# Patient Record
Sex: MALE | Race: OTHER | Employment: STUDENT | URBAN - METROPOLITAN AREA
[De-identification: names, ages, dates, MRNs, and addresses within clinical notes are randomized per-mention and may not be internally consistent; named-entity substitution may affect disease eponyms.]

---
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
## 2021-03-17 ENCOUNTER — OFFICE VISIT (OUTPATIENT)
Dept: FAMILY MEDICINE CLINIC | Facility: CLINIC | Age: 21
End: 2021-03-17
Payer: COMMERCIAL

## 2021-03-17 VITALS
HEART RATE: 57 BPM | HEIGHT: 72 IN | WEIGHT: 143.81 LBS | DIASTOLIC BLOOD PRESSURE: 73 MMHG | BODY MASS INDEX: 19.48 KG/M2 | SYSTOLIC BLOOD PRESSURE: 122 MMHG

## 2021-03-17 DIAGNOSIS — M54.2 NECK PAIN: Primary | ICD-10-CM

## 2021-03-17 PROCEDURE — 3074F SYST BP LT 130 MM HG: CPT | Performed by: FAMILY MEDICINE

## 2021-03-17 PROCEDURE — 3008F BODY MASS INDEX DOCD: CPT | Performed by: FAMILY MEDICINE

## 2021-03-17 PROCEDURE — 3078F DIAST BP <80 MM HG: CPT | Performed by: FAMILY MEDICINE

## 2021-03-17 PROCEDURE — 99213 OFFICE O/P EST LOW 20 MIN: CPT | Performed by: FAMILY MEDICINE

## 2021-03-17 NOTE — PROGRESS NOTES
3/17/2021  10:39 AM    Jonasomuar Copeland. is a 24year old male.     Chief complaint(s): Patient presents with:  Back Pain  Throat Problem: c/o pain with swallowing for couple months   Nasal Congestion    HPI:     Brie Copeland. primary complaint is regardi wheezing. Cardiovascular: Negative for chest pain and palpitations. Musculoskeletal: Positive for back pain. Skin: Negative for rash. Allergic/Immunologic: Negative for environmental allergies and food allergies.    Neurological: Positive for heada the [facility] if there is a deterioration or worsening of the medical condition. Also, inform the doctor with any new symptoms or medications' side effects. FOLLOW-UP: Schedule a follow-up visit in  1-2 weeks/ prn.            Orders This Visit:

## 2021-12-08 ENCOUNTER — TELEMEDICINE (OUTPATIENT)
Dept: FAMILY MEDICINE CLINIC | Facility: CLINIC | Age: 21
End: 2021-12-08
Payer: COMMERCIAL

## 2021-12-08 DIAGNOSIS — B34.9 ACUTE VIRAL SYNDROME: Primary | ICD-10-CM

## 2021-12-08 PROCEDURE — 99443 PHONE E/M BY PHYS 21-30 MIN: CPT | Performed by: FAMILY MEDICINE

## 2021-12-08 RX ORDER — AZITHROMYCIN 250 MG/1
TABLET, FILM COATED ORAL
Qty: 6 TABLET | Refills: 0 | Status: SHIPPED | OUTPATIENT
Start: 2021-12-08 | End: 2021-12-13

## 2021-12-08 RX ORDER — BENZONATATE 200 MG/1
200 CAPSULE ORAL 3 TIMES DAILY PRN
Qty: 30 CAPSULE | Refills: 0 | Status: SHIPPED | OUTPATIENT
Start: 2021-12-08

## 2021-12-08 NOTE — PROGRESS NOTES
Virtual VideoTelephone Check-In    [facility]. verbally consents to a Virtual/Telephone Check-In visit on 12/08/21. Patient has been referred to the [facility] website at [email or web address] to review the yearly Consent to Treat document.     Patient u office with any new or further questions or concerns that may come up in the near future. Notify Dr Karyn Lemus or the [facility], [facility] if there is a deterioration or worsening of the medical condition.  Also, inform the doctor with any new symptoms or medicati

## 2021-12-21 NOTE — TELEPHONE ENCOUNTER
Pt's mother, Sarah Green is calling stating pt is experiencing vomiting, diarrhea, and fever. She states pt saw Dr. Noah Ramos for this issue last week, however pt symptoms are getting worse.     Transf to Triage Are Labs Available For Review?: No- Not Drawn Yet

## 2023-01-12 ENCOUNTER — OFFICE VISIT (OUTPATIENT)
Dept: FAMILY MEDICINE CLINIC | Facility: CLINIC | Age: 23
End: 2023-01-12

## 2023-01-12 ENCOUNTER — HOSPITAL ENCOUNTER (OUTPATIENT)
Dept: GENERAL RADIOLOGY | Age: 23
Discharge: HOME | End: 2023-01-12
Attending: FAMILY MEDICINE
Payer: COMMERCIAL

## 2023-01-12 VITALS
HEIGHT: 72 IN | SYSTOLIC BLOOD PRESSURE: 132 MMHG | DIASTOLIC BLOOD PRESSURE: 78 MMHG | WEIGHT: 138.81 LBS | BODY MASS INDEX: 18.8 KG/M2 | HEART RATE: 76 BPM

## 2023-01-12 DIAGNOSIS — M77.8 THUMB TENDONITIS: ICD-10-CM

## 2023-01-12 DIAGNOSIS — M79.644 PAIN OF RIGHT THUMB: ICD-10-CM

## 2023-01-12 DIAGNOSIS — M79.644 PAIN OF RIGHT THUMB: Primary | ICD-10-CM

## 2023-01-12 PROCEDURE — 99213 OFFICE O/P EST LOW 20 MIN: CPT | Performed by: FAMILY MEDICINE

## 2023-01-12 PROCEDURE — 3078F DIAST BP <80 MM HG: CPT | Performed by: FAMILY MEDICINE

## 2023-01-12 PROCEDURE — 73130 X-RAY EXAM OF HAND: CPT | Performed by: FAMILY MEDICINE

## 2023-01-12 PROCEDURE — 3008F BODY MASS INDEX DOCD: CPT | Performed by: FAMILY MEDICINE

## 2023-01-12 PROCEDURE — 3075F SYST BP GE 130 - 139MM HG: CPT | Performed by: FAMILY MEDICINE

## 2023-12-27 ENCOUNTER — OFFICE VISIT (OUTPATIENT)
Dept: OTOLARYNGOLOGY | Facility: CLINIC | Age: 23
End: 2023-12-27

## 2023-12-27 DIAGNOSIS — R07.0 ODYNOPHONIA: ICD-10-CM

## 2023-12-27 DIAGNOSIS — R13.10 ODYNOPHAGIA: ICD-10-CM

## 2023-12-27 DIAGNOSIS — R49.0 DYSPHONIA: Primary | ICD-10-CM

## 2023-12-27 PROCEDURE — 31575 DIAGNOSTIC LARYNGOSCOPY: CPT | Performed by: STUDENT IN AN ORGANIZED HEALTH CARE EDUCATION/TRAINING PROGRAM

## 2023-12-27 PROCEDURE — 99203 OFFICE O/P NEW LOW 30 MIN: CPT | Performed by: STUDENT IN AN ORGANIZED HEALTH CARE EDUCATION/TRAINING PROGRAM

## 2023-12-27 NOTE — PROGRESS NOTES
[place]  OTOLARYNGOLOGY - HEAD & NECK SURGERY    12/27/2023     Reason for Consultation:   Voice strain    History of Present Illness:   Patient is a pleasant 26year old male who is being seen for vocal issues over the last few weeks. 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 He does note he had a recent viral upper respiratory infection and was having sharp pain with swallowing. He is concerned about his vocal performance due to his upcoming recording. He denies any history of acid reflux. He has been practicing a lot recently making him a little more prone to vocal trauma. He does also sometimes feel tightness within his throat. He has not had any recent opportunity for voice rest.    Past Medical History  History reviewed. No pertinent past medical history. Past Surgical History  History reviewed. No pertinent surgical history. Family History  Family History   Problem Relation Age of Onset    Colon Cancer Paternal Grandmother        Social History  Pediatric History   Patient Parents    Guadalupe Terrazas (Father)    Srikanth Spencer (Mother)     Other Topics Concern    Not on file   Social History Narrative    Not on file           Current Medications:  Current Outpatient Medications   Medication Sig Dispense Refill    diclofenac 1 % External Gel Apply 4 g topically 4 (four) times daily. Apply to affected area(s). 60 g 2       Allergies  No Known Allergies    Review of Systems:   A comprehensive 10 point review of systems was completed. Pertinent positives and negatives noted in the the HPI. Physical Exam:   There were no vitals taken for this visit.     GENERAL: No acute distress, Comfortable appearing  FACE: HB 1/6, Normal Animation  HEAD: Normocephalic  EYES: EOMI, pupils equil  EARS: Bilateral Auricles Symmetric   NOSE: Nares patent bilaterally  ORAL CAVITY: Tongue mobile, Oropharynx clear, Floor of mouth clear, Posterior oropharynx normal  NECK: No palpable lymphadenopathy, thyroid not palpable, nontender    PROCEDURE: FLEXIBLE FIBEROPTIC LARYNGOSCOPY (99720)    Due to inability for adequate examination of the larynx and need for magnification to perform the examination, endoscopy was performed. Risks and benefits were discussed with patient/family and they have given verbal consent to proceed. Procedure Detail & Findings:     After placement of topical anesthetic intranasally the flexible laryngoscope was inserted into the nare and driven through the nasal cavity with no significant abnormal findings noted in the nasal cavities or nasopharynx. The oropharynx, hypopharynx and larynx were subsequently examined and the following findings were noted:    Base of Tongue: Normal    Valeculla: Normal    Arytenoids: Normal    Introitus of the esophagus: Normal    Epiglottis: Normal    False vocal cords: Normal    True vocal cords: Normal with some posterior commisure ulcerations, small    Subglottic space: Normal    Mobility of True vocal cords: Normal    Condition: Stable    Complications: Patient tolerated the procedure well with no immediate complication. Results:     Laboratory Data:  No results found for: \"WBC\", \"HGB\", \"HCT\", \"PLT\", \"CREATSERUM\", \"BUN\", \"NA\", \"K\", \"CL\", \"CO2\", \"GLU\", \"CA\", \"ALB\", \"ALKPHO\", \"TP\", \"AST\", \"ALT\", \"PTT\", \"INR\", \"PTP\", \"T4F\", \"TSH\", \"TSHREFLEX\", \"SHELBI\", \"LIP\", \"GGT\", \"PSA\", \"DDIMER\", \"ESRML\", \"ESRPF\", \"CRP\", \"BNP\", \"MG\", \"PHOS\", \"TROP\", \"CK\", \"CKMB\", \"CHEN\", \"RPR\", \"B12\", \"ETOH\", \"POCGLU\"      Imaging:  No results found. Impression:   Dysphonia  Odynophagia  Odynophonia    Recommendations:  I would recommend at least a few days of voice rest, along with good hydration  He does have a recording coming up states he does need to practice however he will try to take a few days off from practicing  He will return to see me if he continues to have any issues    Thank you for allowing me to participate in the care of your patient.     Neo Joseph DO Lucrecia   Otolaryngology/Rhinology, Sinus, and Endoscopic Skull Base Surgery  [facility]   [address] [facility]  Phone [phone]  Fax [phone]  12/27/2023  8:15 AM  12/27/2023

## 2024-02-07 ENCOUNTER — OFFICE VISIT (OUTPATIENT)
Dept: OTOLARYNGOLOGY | Facility: CLINIC | Age: 24
End: 2024-02-07

## 2024-02-07 VITALS — BODY MASS INDEX: 18.69 KG/M2 | WEIGHT: 138 LBS | HEIGHT: 72 IN

## 2024-02-07 DIAGNOSIS — R13.10 ODYNOPHAGIA: ICD-10-CM

## 2024-02-07 DIAGNOSIS — R49.0 DYSPHONIA: Primary | ICD-10-CM

## 2024-02-07 DIAGNOSIS — R07.0 ODYNOPHONIA: ICD-10-CM

## 2024-02-07 PROCEDURE — 99214 OFFICE O/P EST MOD 30 MIN: CPT | Performed by: STUDENT IN AN ORGANIZED HEALTH CARE EDUCATION/TRAINING PROGRAM

## 2024-02-07 PROCEDURE — 31575 DIAGNOSTIC LARYNGOSCOPY: CPT | Performed by: STUDENT IN AN ORGANIZED HEALTH CARE EDUCATION/TRAINING PROGRAM

## 2024-02-07 RX ORDER — OMEPRAZOLE 40 MG/1
40 CAPSULE, DELAYED RELEASE ORAL DAILY
Qty: 30 CAPSULE | Refills: 2 | Status: SHIPPED | OUTPATIENT
Start: 2024-02-07

## 2024-02-07 NOTE — PROGRESS NOTES
[place]  OTOLARYNGOLOGY - HEAD & NECK SURGERY    2/7/2024     Reason for Consultation:   Voice strain    History of Present Illness:   Patient is a pleasant 23 year old male who is being seen for vocal issues over the last few weeks.  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  He does note he had a recent viral upper respiratory infection and was having sharp pain with swallowing.  He is concerned about his vocal performance due to his upcoming recording.  He denies any history of acid reflux.  He has been practicing a lot recently making him a little more prone to vocal trauma.  He does also sometimes feel tightness within his throat.  He has not had any recent opportunity for voice rest.    INTERVAL HISTORY  2/7/2024: Patient continues to have symptoms of throat pain and voice strain. He is still able to perform but is still symptomatic    Past Medical History  History reviewed. No pertinent past medical history.    Past Surgical History  History reviewed. No pertinent surgical history.    Family History  Family History   Problem Relation Age of Onset    Colon Cancer Paternal Grandmother        Social History  Pediatric History   Patient Parents    Vince Arguello (Father)    Wendy Arguello (Mother)     Other Topics Concern    Not on file   Social History Narrative    Not on file           Current Medications:  Current Outpatient Medications   Medication Sig Dispense Refill    Omeprazole 40 MG Oral Capsule Delayed Release Take 1 capsule (40 mg total) by mouth daily. Before a meal 30 capsule 2    diclofenac 1 % External Gel Apply 4 g topically 4 (four) times daily. Apply to affected area(s). 60 g 2       Allergies  No Known Allergies    Review of Systems:   A comprehensive 10 point review of systems was completed.  Pertinent positives and negatives noted in the the HPI.    Physical Exam:   Height 6' (1.829 m), weight 138 lb (62.6 kg).    GENERAL: No acute distress, Comfortable  appearing  FACE: HB 1/6, Normal Animation  HEAD: Normocephalic  EYES: EOMI, pupils equil  EARS: Bilateral Auricles Symmetric   NOSE: Nares patent bilaterally  ORAL CAVITY: Tongue mobile, Oropharynx clear, Floor of mouth clear, Posterior oropharynx normal  NECK: No palpable lymphadenopathy, thyroid not palpable, nontender    PROCEDURE: FLEXIBLE FIBEROPTIC LARYNGOSCOPY (88365)    Due to inability for adequate examination of the larynx and need for magnification to perform the examination, endoscopy was performed.  Risks and benefits were discussed with patient/family and they have given verbal consent to proceed.    Procedure Detail & Findings:     After placement of topical anesthetic intranasally the flexible laryngoscope was inserted into the nare and driven through the nasal cavity with no significant abnormal findings noted in the nasal cavities or nasopharynx. The oropharynx, hypopharynx and larynx were subsequently examined and the following findings were noted:    Base of Tongue: Normal    Valeculla: Normal    Arytenoids: Normal    Introitus of the esophagus: Mild edema and pachydermia    Epiglottis: Normal    False vocal cords: Normal    True vocal cords: Normal with a right TVC small nodule, likely callous formation    Subglottic space: Normal    Mobility of True vocal cords: Normal    Condition: Stable    Complications: Patient tolerated the procedure well with no immediate complication.    Results:     Laboratory Data:  No results found for: \"WBC\", \"HGB\", \"HCT\", \"PLT\", \"CREATSERUM\", \"BUN\", \"NA\", \"K\", \"CL\", \"CO2\", \"GLU\", \"CA\", \"ALB\", \"ALKPHO\", \"TP\", \"AST\", \"ALT\", \"PTT\", \"INR\", \"PTP\", \"T4F\", \"TSH\", \"TSHREFLEX\", \"SHELBI\", \"LIP\", \"GGT\", \"PSA\", \"DDIMER\", \"ESRML\", \"ESRPF\", \"CRP\", \"BNP\", \"MG\", \"PHOS\", \"TROP\", \"CK\", \"CKMB\", \"CHEN\", \"RPR\", \"B12\", \"ETOH\", \"POCGLU\"      Imaging:  No results found.      Impression:   Dysphonia  Odynophagia  Odynophonia    Recommendations:  I will have him start PPI  He will take 600mg of  ibuprofen as needed for his anterior neck tightness  I also placed a speech therapy referral.    Thank you for allowing me to participate in the care of your patient.    Royce Singer,    Otolaryngology/Rhinology, Sinus, and Endoscopic Skull Base Surgery  [address] [address]  Phone [phone]  Fax [phone]  12/27/2023  8:15 AM  2/7/2024

## (undated) NOTE — LETTER
10/30/2017              Edyta Pino        [place]         To School Nurse,  This is to inform you that Edyta Pino, yeny 2000 is a patient under my medical care.  Please allow him permission to be excused from

## (undated) NOTE — LETTER
10/31/2017          To Whom It May Concern:    Octaviano Zamudio is currently under my medical care. Please allow Anai Knox to utilize the bathroom when necessary. If you require additional information please contact our office.         Sincerely,          TONJA

## (undated) NOTE — LETTER
10/28/19    [facility] [id number]      Dear Fior Alanis,    [facility] records indicate that you have outstanding lab work and or testing that was ordered for you and has not yet been completed:  Orders Placed This Encounter

## (undated) NOTE — LETTER
10/1/2019      To Whom It May Concern:    Feng Eaton is currently under my medical care and may be excused from school on Monday 9/30 and Tuesday 10/1. In addition, for the time being Wonda Husbands should be allowed extra time for bathroom breaks.     If